# Patient Record
Sex: FEMALE | Race: BLACK OR AFRICAN AMERICAN | Employment: UNEMPLOYED | URBAN - METROPOLITAN AREA
[De-identification: names, ages, dates, MRNs, and addresses within clinical notes are randomized per-mention and may not be internally consistent; named-entity substitution may affect disease eponyms.]

---

## 2020-05-27 ENCOUNTER — HOSPITAL ENCOUNTER (EMERGENCY)
Age: 35
Discharge: HOME | End: 2020-05-28
Attending: EMERGENCY MEDICINE
Payer: COMMERCIAL

## 2020-05-27 PROCEDURE — 99283 EMERGENCY DEPT VISIT LOW MDM: CPT

## 2020-05-28 VITALS
OXYGEN SATURATION: 100 % | SYSTOLIC BLOOD PRESSURE: 136 MMHG | TEMPERATURE: 98.5 F | RESPIRATION RATE: 16 BRPM | HEART RATE: 67 BPM | DIASTOLIC BLOOD PRESSURE: 90 MMHG

## 2020-05-28 LAB
BACTERIA: ABNORMAL /HPF
BILIRUBIN URINE: NEGATIVE
BLOOD, URINE: NEGATIVE
CLARITY: ABNORMAL
COLOR: YELLOW
EPITHELIAL CELLS, UA: 7 /HPF (ref 0–5)
GLUCOSE URINE: NEGATIVE MG/DL
HCG(URINE) PREGNANCY TEST: NEGATIVE
HYALINE CASTS: 2 /LPF (ref 0–8)
KETONES, URINE: NEGATIVE MG/DL
LEUKOCYTE ESTERASE, URINE: NEGATIVE
MICROSCOPIC EXAMINATION: YES
NITRITE, URINE: NEGATIVE
PH UA: 6 (ref 5–8)
PROTEIN UA: NEGATIVE MG/DL
RBC UA: 2 /HPF (ref 0–4)
SPECIFIC GRAVITY UA: 1.01 (ref 1–1.03)
URINE REFLEX TO CULTURE: ABNORMAL
URINE TYPE: ABNORMAL
UROBILINOGEN, URINE: 0.2 E.U./DL
WBC UA: 6 /HPF (ref 0–5)

## 2020-05-28 PROCEDURE — 81001 URINALYSIS AUTO W/SCOPE: CPT

## 2020-05-28 PROCEDURE — 84703 CHORIONIC GONADOTROPIN ASSAY: CPT

## 2020-05-28 ASSESSMENT — ENCOUNTER SYMPTOMS
PHOTOPHOBIA: 0
TROUBLE SWALLOWING: 0
SORE THROAT: 0
ABDOMINAL PAIN: 0
SINUS PAIN: 0
VOMITING: 0
SHORTNESS OF BREATH: 0
NAUSEA: 0
COUGH: 0
SINUS PRESSURE: 0
COLOR CHANGE: 0

## 2020-05-28 NOTE — ED PROVIDER NOTES
[address] [facility]  EMERGENCY DEPARTMENTENCOUNTER      Pt Name: Effie Ware  MRN: [id number]  [place] 1985  Date ofevaluation: 5/27/2020  Provider: Emily Steward, [facility]       Chief Complaint   Patient presents with    Dizziness     patient reporting at [place] [address] and reports having one ernie and one shot was able to get to car, once in car her dizziness started. HISTORY OF PRESENT ILLNESS   (Location/Symptom, Timing/Onset,Context/Setting, Quality, Duration, Modifying Factors, Severity)  Note limiting factors. Effie Ware is a 38 y.o. female  who  has no past medical history on file. who presents to the emergency department for evaluation of dizziness. Patient states she has a history of intermittent episodes of vertigo and was seen by her PCP who was prescribed meclizine. She states that this evening she was at [employer], and when she tried to drive home she began feeling dizzy. She describes a sensation of the room spinning similar previous episodes. She states she did have multiple alcoholic beverages prior to the episode. She denies difficulty speaking facial droop paresthesias or weakness. She states her symptoms were improved when she closed her eyes. She states that are reproducible when she changes position or turns her head. Denies a history of congestion no recent illness ear fullness or changes in hearing. She states she has not seen a specialist about her symptoms. HPI    NursingNotes were reviewed. REVIEW OF SYSTEMS    (2-9 systems for level 4, 10 or more for level 5)     Review of Systems   Constitutional: Negative for activity change, fatigue and fever. HENT: Negative for congestion, ear discharge, ear pain, hearing loss, mouth sores, sinus pressure, sinus pain, sore throat and trouble swallowing. Eyes: Negative for photophobia and visual disturbance.    Respiratory: Negative for cough and shortness session: None    Stress: None   Relationships    Social connections     Talks on phone: None     Gets together: None     Attends Adventism service: None     Active member of club or organization: None     Attends meetings of clubs or organizations: None     Relationship status: None    Intimate partner violence     Fear of current or ex partner: None     Emotionally abused: None     Physically abused: None     Forced sexual activity: None   Other Topics Concern    None   Social History Narrative    None       SCREENINGS             PHYSICAL EXAM    (up to 7 for level 4, 8 or more for level 5)     ED Triage Vitals [05/27/20 2113]   BP Temp Temp src Pulse Resp SpO2 Height Weight   (!) 147/93 98.5 °F (36.9 °C) -- 73 16 98 % -- --       Physical Exam  Vitals signs and nursing note reviewed. Constitutional:       Appearance: She is well-developed. HENT:      Head: Normocephalic and atraumatic. Eyes:      Extraocular Movements: Extraocular movements intact. Right eye: No nystagmus. Left eye: No nystagmus. Conjunctiva/sclera: Conjunctivae normal.      Pupils: Pupils are equal, round, and reactive to light. Comments: Negative test of skew   Neck:      Musculoskeletal: Normal range of motion. Trachea: No tracheal deviation. Cardiovascular:      Rate and Rhythm: Normal rate and regular rhythm. Heart sounds: Normal heart sounds. Pulmonary:      Effort: Pulmonary effort is normal.      Breath sounds: Normal breath sounds. Abdominal:      General: There is no distension. Palpations: Abdomen is soft. Tenderness: There is no abdominal tenderness. Musculoskeletal: Normal range of motion. Skin:     General: Skin is warm and dry. Capillary Refill: Capillary refill takes less than 2 seconds. Neurological:      General: No focal deficit present. Mental Status: She is alert and oriented to person, place, and time. Mental status is at baseline.       Cranial Nerves:

## 2020-05-28 NOTE — ED NOTES
Pt Discharged in stable condition, VSS, no signs of distress, discharge instructions and meds reviewed. Pt verbalizes understanding and states no further questions or concerns unaddressed.        Jeff Augustin RN  05/28/20 4046

## 2020-06-09 ENCOUNTER — OFFICE VISIT (OUTPATIENT)
Dept: ENT CLINIC | Age: 35
End: 2020-06-09
Payer: COMMERCIAL

## 2020-06-09 VITALS — TEMPERATURE: 97.4 F | SYSTOLIC BLOOD PRESSURE: 113 MMHG | HEART RATE: 67 BPM | DIASTOLIC BLOOD PRESSURE: 80 MMHG

## 2020-06-09 PROCEDURE — 99204 OFFICE O/P NEW MOD 45 MIN: CPT | Performed by: OTOLARYNGOLOGY

## 2020-06-09 RX ORDER — BUSPIRONE HYDROCHLORIDE 5 MG/1
5 TABLET ORAL 3 TIMES DAILY PRN
COMMUNITY
Start: 2020-06-08 | End: 2020-09-06

## 2020-06-09 RX ORDER — MECLIZINE HCL 12.5 MG/1
TABLET ORAL
COMMUNITY
Start: 2020-03-31

## 2020-06-09 RX ORDER — METHYLPREDNISOLONE 4 MG/1
4 TABLET ORAL SEE ADMIN INSTRUCTIONS
Qty: 1 KIT | Refills: 0 | Status: SHIPPED | OUTPATIENT
Start: 2020-06-09

## 2020-06-09 ASSESSMENT — ENCOUNTER SYMPTOMS
RESPIRATORY NEGATIVE: 1
ALLERGIC/IMMUNOLOGIC NEGATIVE: 1
EYES NEGATIVE: 1

## 2022-10-26 ENCOUNTER — OFFICE VISIT (OUTPATIENT)
Dept: BARIATRICS/WEIGHT MGMT | Age: 37
End: 2022-10-26

## 2022-10-26 VITALS
SYSTOLIC BLOOD PRESSURE: 132 MMHG | WEIGHT: 289 LBS | BODY MASS INDEX: 54.56 KG/M2 | HEIGHT: 61 IN | DIASTOLIC BLOOD PRESSURE: 84 MMHG

## 2022-10-26 DIAGNOSIS — Z01.818 PREOPERATIVE CLEARANCE: ICD-10-CM

## 2022-10-26 DIAGNOSIS — F41.9 ANXIETY: ICD-10-CM

## 2022-10-26 PROBLEM — I10 HYPERTENSION: Status: ACTIVE | Noted: 2022-10-26

## 2022-10-26 PROCEDURE — 99999 PR OFFICE/OUTPT VISIT,PROCEDURE ONLY: CPT

## 2022-10-26 RX ORDER — HYDROXYZINE HYDROCHLORIDE 25 MG/1
25 TABLET, FILM COATED ORAL 3 TIMES DAILY PRN
COMMUNITY

## 2022-10-26 RX ORDER — BUSPIRONE HYDROCHLORIDE 15 MG/1
15 TABLET ORAL 3 TIMES DAILY
COMMUNITY

## 2022-10-26 RX ORDER — VENLAFAXINE 37.5 MG/1
37.5 TABLET ORAL 3 TIMES DAILY
COMMUNITY

## 2022-10-26 RX ORDER — METOPROLOL TARTRATE AND HYDROCHLOROTHIAZIDE 100; 50 MG/1; MG/1
1 TABLET ORAL DAILY
COMMUNITY

## 2022-10-26 NOTE — PROGRESS NOTES
Misa Adkins is a 42 y.o. female with a date of birth of 1985. Vitals:    10/26/22 1441   BP: 132/84   Site: Left Upper Arm   Weight: 289 lb (131.1 kg)   Height: 5' 1\" (1.549 m)    BMI: Body mass index is 54.61 kg/m². Obesity Classification: Class III    Weight History: Wt Readings from Last 3 Encounters:   10/26/22 289 lb (131.1 kg)   04/19/16 250 lb (113.4 kg)     Patient's lowest adult weight was 195 lb at age 17's. Patient's highest adult weight was 289 lb at age 42 current. Feels she gained significant weight when prescribed Zoloft. Patient has participated in the following weight loss programs: Weight Watcher Anonymous, Cabbage Soup Diet, , low fat, low carb. Patient has not participated in meal replacement/liquid diets. Patient has participated in weight loss medications. Contrave past year, had palpitations     Patient is not lactose intolerant. Patient does not have food allergies. Patient does not have Confucianist/cultural food preferences. 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 Breakfast: Sausage mc muffin OR 4 slices mary  Snack: None  Lunch: Chix salad on 1/2 croissant + crackers  Snack: None OR nuts OR orange   Dinner: 1/2 container chix salad  Snack: Oh snap pickles  Struggles to fall asleep, may stay up late  Drinks throughout the day: water, zero pop every other day, zero flavors   Do you drink alcohol? yes. How often/how much alcohol do you drink: 2-3 glasses of wine per month. Patient does not meet the criteria for binge eating disorder. Patient does not have grazing. Patient does have night eating. Patient does have a history of emotional eating or eating out of boredom.       Goals  Weight: 220 lbs  Health Improvement: better qol/ energy/ motivation, get off BP meds     Assessment  Nutritional Needs: RMR=(9.99 x 131.1) + (6.25 x 154.9) - (4.92 x 37 y.o.) -161= 1925 kcal x 1.3 (sedentary activity factor)= 2503 kcal - 1000 (for 2 lb weight loss/week)= 1503 kcal.    Plan  Plan/Recommendations: General weight loss/lifestyle modification strategies discussed (elicit support from others; identify saboteurs; non-food rewards, etc). Diet interventions: 1500 kcal LC. Regular aerobic exercise program discussed. Optifast:  Interested - sample provided   Diet Medications:  Interested   Surgery: Not Interested  Work with behaviorist     Handouts: LCFM, Protein drinks     PES Statement: Overweight/Obesity related to lack of exercise, sedentary lifestyle, unhealthy eating habits, and unsuccessful diet attempts as evidenced by BMI. Body mass index is 54.61 kg/m². Will follow up as necessary.     Ellen Beebe RD, LD

## 2022-10-27 ENCOUNTER — TELEMEDICINE (OUTPATIENT)
Dept: BARIATRICS/WEIGHT MGMT | Age: 37
End: 2022-10-27
Payer: COMMERCIAL

## 2022-10-27 DIAGNOSIS — E66.01 MORBID OBESITY WITH BMI OF 50.0-59.9, ADULT (HCC): Primary | ICD-10-CM

## 2022-10-27 DIAGNOSIS — Z71.3 DIETARY COUNSELING AND SURVEILLANCE: ICD-10-CM

## 2022-10-27 DIAGNOSIS — I10 HTN (HYPERTENSION), BENIGN: ICD-10-CM

## 2022-10-27 PROCEDURE — 99204 OFFICE O/P NEW MOD 45 MIN: CPT | Performed by: FAMILY MEDICINE

## 2022-10-27 ASSESSMENT — ENCOUNTER SYMPTOMS
CHOKING: 0
CHEST TIGHTNESS: 0
BLOOD IN STOOL: 0
NAUSEA: 0
ABDOMINAL PAIN: 0
COUGH: 0
VOMITING: 0
EYE PAIN: 0
APNEA: 0
DIARRHEA: 0
WHEEZING: 0
PHOTOPHOBIA: 0
CONSTIPATION: 0
SHORTNESS OF BREATH: 0
ABDOMINAL DISTENTION: 0

## 2022-10-27 NOTE — PROGRESS NOTES
Patient: Airam Vogt     Encounter Date: 10/27/2022    YOB: 1985               Age: 42 y.o. Patient identification was verified at the start of the visit. Patient-Reported Vitals 10/27/2022   Patient-Reported Weight 289   Patient-Reported Height 52   Patient-Reported Systolic 105   Patient-Reported Diastolic 84         BP Readings from Last 1 Encounters:   10/26/22 132/84       BMI Readings from Last 1 Encounters:   10/26/22 54.61 kg/m²       Pulse Readings from Last 1 Encounters:   06/09/20 67                                             Wt Readings from Last 3 Encounters:   10/26/22 289 lb (131.1 kg)   04/19/16 250 lb (113.4 kg)        Chief Complaint   Patient presents with    Bariatric, Initial Visit     MWMARBIN- NP         HPI:    42 y.o. female presents to establish care via video visit. The patient's medical history is significant for class III obesity. The patient has a long-standing history of obesity which started gradually. The problem is severe. The patient has been gaining weight. Risk factors include annual weight gain of >2 lbs (1 kg)/ year and sedentary lifestyle. Aggravating factors include poor diet and lack of physical activity. The patient has tried various diet/exercise plans which have been ineffective in the long-run. She is motivated to start losing weight to help improve her health. When did you become overweight? [] Childhood   [x] Teens   [] Adulthood   [] Pregnancy   [] Menopause    Highest adult weight: Current     Triggers for weight gain? [x] Stress   [] Illness   [x] Medications- Zoloft    [] Travel  []Injury     [] Nightshift work   [] Insomnia  [] No specific triggers   [] Other    Food triggers:   [x] Stress   [x] Boredom   [] Fast food   [] Eating out   [] Seeking reward   [] Social     Have you ever taken prescription medications to help you lose weight?    [x] Yes- Contrave (side effects- palpitations)  [] No    Have you ever been on a meal replacement program?  [] Yes  [x] No      The patient denies any significant cardiac or psychiatric disease. The patient denies a history thyroid disease. The patient denies a history of glaucoma. The patient denies a history of nephrolithiasis. The patient denies a history of seizure disorders/epiliepsy. Dietitian's assessment reviewed and addressed with patient. Reviewed:  [x] Nutrition and the importance of regular protein intake  [x] Hidden CHO/carbohydrate sources  [x] Alcohol use  [x] Tobacco use  [x] Drug use- Denies   [x] Importance of exercise and reducing sedentary time        Controlled Substance Monitoring:     No flowsheet data found. Allergies   Allergen Reactions    Amlodipine     Amoxicillin          Current Outpatient Medications:     hydrOXYzine HCl (ATARAX) 25 MG tablet, Take 25 mg by mouth 3 times daily as needed for Itching, Disp: , Rfl:     venlafaxine (EFFEXOR) 37.5 MG tablet, Take 37.5 mg by mouth 3 times daily, Disp: , Rfl:     metoprolol-hydroCHLOROthiazide (LOPRESSOR HCT) 100-50 MG per tablet, Take 1 tablet by mouth daily, Disp: , Rfl:     busPIRone (BUSPAR) 15 MG tablet, Take 15 mg by mouth 3 times daily, Disp: , Rfl:     meclizine (ANTIVERT) 12.5 MG tablet, TAKE 1 TABLET BY MOUTH THREE TIMES DAILY AS NEEDED FOR DIZZINESS (Patient not taking: Reported on 10/26/2022), Disp: , Rfl:     methylPREDNISolone (MEDROL, XIMENA,) 4 MG tablet, Take 1 tablet by mouth See Admin Instructions Take by mouth.  (Patient not taking: Reported on 10/26/2022), Disp: 1 kit, Rfl: 0    hydrochlorothiazide (HYDRODIURIL) 25 MG tablet, Take 25 mg by mouth daily (Patient not taking: Reported on 10/26/2022), Disp: , Rfl:     metoprolol (LOPRESSOR) 50 MG tablet, Take 50 mg by mouth 2 times daily (Patient not taking: Reported on 10/26/2022), Disp: , Rfl:     Patient Active Problem List   Diagnosis    Hypertension    Anxiety    Preoperative clearance       Past Medical History:   Diagnosis Date Anxiety        Past Surgical History:   Procedure Laterality Date    KNEE SURGERY         Family History   Problem Relation Age of Onset    Asthma Mother     Diabetes Mother     Hypertension Mother     Glaucoma Mother     Hypertension Father        Review of Systems   Constitutional:  Negative for fatigue. Eyes:  Negative for photophobia, pain and visual disturbance. Respiratory:  Negative for apnea, cough, choking, chest tightness, shortness of breath and wheezing. Cardiovascular:  Negative for chest pain, palpitations and leg swelling. Gastrointestinal:  Negative for abdominal distention, abdominal pain, blood in stool, constipation, diarrhea, nausea and vomiting. Endocrine: Negative for cold intolerance and heat intolerance. Musculoskeletal:  Negative for arthralgias and myalgias. Skin:  Negative for rash. Neurological:  Negative for dizziness, tremors, syncope, weakness, numbness and headaches. Psychiatric/Behavioral:  Negative for agitation, confusion, decreased concentration, dysphoric mood, hallucinations, sleep disturbance and suicidal ideas. The patient is not nervous/anxious and is not hyperactive. Physical Exam  Constitutional:       Appearance: She is well-developed. HENT:      Head: Normocephalic. Eyes:      Conjunctiva/sclera: Conjunctivae normal.   Abdominal:      General: Abdomen is protuberant. Musculoskeletal:         General: No swelling. Neurological:      Mental Status: She is alert and oriented to person, place, and time. Psychiatric:         Mood and Affect: Mood normal.         Behavior: Behavior normal.         Thought Content:  Thought content normal.         Judgment: Judgment normal.       Admission on 05/27/2020, Discharged on 05/28/2020   Component Date Value Ref Range Status    Color, UA 05/28/2020 YELLOW  Straw/Yellow Final    Clarity, UA 05/28/2020 CLOUDY (A)  Clear Final    Glucose, Ur 05/28/2020 Negative  Negative mg/dL Final    Bilirubin Urine 05/28/2020 Negative  Negative Final    Ketones, Urine 05/28/2020 Negative  Negative mg/dL Final    Specific Gravity, UA 05/28/2020 1.010  1.005 - 1.030 Final    Blood, Urine 05/28/2020 Negative  Negative Final    pH, UA 05/28/2020 6.0  5.0 - 8.0 Final    Protein, UA 05/28/2020 Negative  Negative mg/dL Final    Urobilinogen, Urine 05/28/2020 0.2  <2.0 E.U./dL Final    Nitrite, Urine 05/28/2020 Negative  Negative Final    Leukocyte Esterase, Urine 05/28/2020 Negative  Negative Final    Microscopic Examination 05/28/2020 YES   Final    Urine Type 05/28/2020 Voided   Final    Urine received in a container without preservatives. Urine Reflex to Culture 05/28/2020 Not Indicated   Final    HCG(Urine) Pregnancy Test 05/28/2020 Negative  Detects HCG level >20 MIU/mL Final    Comment: Note:  Always repeat results in question with a serum  quantitative pregnancy test. A serum hCG is positive  2-5 days before the urine hCG test.      Bacteria, UA 05/28/2020 1+ (A)  None Seen /HPF Final    Hyaline Casts, UA 05/28/2020 2  0 - 8 /LPF Final    WBC, UA 05/28/2020 6 (A)  0 - 5 /HPF Final    RBC, UA 05/28/2020 2  0 - 4 /HPF Final    Epithelial Cells, UA 05/28/2020 7 (A)  0 - 5 /HPF Final    Comment: Urinalysis microscopic performed using the  automated methodology (AUWI analyzer). Assessment and Plan:    1. Morbid obesity with BMI of 50.0-59.9, adult ([facility] [address]  Heavily counseled on the importance of therapeutic lifestyle changes through diet and exercise. The patient understands that the goal of treatment is to reach and stay at a healthy weight. The initial treatment goal is to lose at least 5-10% of her body weight in 12 weeks. This will require changes in eating habits, increased physical activity, and behavior changes. Counseled on low carb/dinorah diet. Patient handouts and education material provided and reviewed in detail with the patient. All questions answered.  Encouraged patient to keep a food journal and to bring it to her next visit. Discussed available treatment options in addition to lifestyle changes including medications or OPTIFAST. She is interested in anti-obesity medications (wants to avoid stimulant based tx). Saxenda or Oliver Dooleyon may be recommended at the next visit depending on her progress and lab results. Explained that medications/meal replacements are most effective as part of a comprehensive treatment plan that includes proper nutrition, physical activity, and behavior modification. The patient understands that she will need close follow-ups every 2-4 weeks if she starts treatment. Depending on the patient's success with these changes, she may also be a good candidate for bariatric surgery down the line. Follow-up in 2 weeks to discuss lab results and treatment plan. Patient advised that it is her responsibility to follow up on any ordered tests/lab results. Patient understands and agrees with the plan. - TSH with Reflex; Future  - Vitamin B12 & Folate; Future  - Vitamin D 25 Hydroxy; Future  - Comprehensive Metabolic Panel; Future  - Hemoglobin A1C; Future  - Lipid Panel; Future  - CBC; Future    2. Dietary counseling and surveillance  1500-Rafiq/low carb meal plan as prescribed. 3. HTN (hypertension), benign  Stable on tx.  5-10% weight loss can help improve this.            Nutrition:  [] LCHF/Ketogenic [x] Low carb/low-calorie diet [] Low-calorie diet     []Maintenance        FITTE:   [x] Cardio [] Resistance/stength exercises   [x] ACSM recommendations (150 minutes/week)           Behavior:   [x] Motivational interviewing performed    [] Referral for counseling  [x] Discussed strategies to overcome habits/challenges for focus      [x] Stress management   [x] Stimulus control  [x] Sleep hygiene      Orders Placed This Encounter   Procedures    TSH with Reflex     Standing Status:   Future     Standing Expiration Date:   10/27/2023    Vitamin B12 & Folate     Standing Status:   Future     Standing Expiration Date:   10/27/2023    Vitamin D 25 Hydroxy     Standing Status:   Future     Standing Expiration Date:   10/27/2023    Comprehensive Metabolic Panel     Standing Status:   Future     Standing Expiration Date:   10/27/2023    Hemoglobin A1C     Standing Status:   Future     Standing Expiration Date:   10/27/2023    Lipid Panel     Standing Status:   Future     Standing Expiration Date:   10/27/2023    CBC     Standing Status:   Future     Standing Expiration Date:   10/27/2023         No follow-ups on file. Saige Marc is a 42 y.o. female being evaluated by a Virtual Visit (video visit) encounter to address concerns as mentioned above. A caregiver was present when appropriate. Due to this being a TeleHealth encounter (During [facility]- public health emergency), evaluation of the following organ systems was limited: Vitals/Constitutional/EENT/Resp/CV/GI//MS/Neuro/Skin/Heme-Lymph-Imm. Pursuant to the emergency declaration under the [address] [place] [address] and the [employer] and [employer] Act, this Virtual Visit was conducted with patient's (and/or legal guardian's) consent, to reduce the patient's risk of exposure to COVID-19 and provide necessary medical care. The patient (and/or legal guardian) has also been advised to contact this office for worsening conditions or problems, and seek emergency medical treatment and/or call 911 if deemed necessary. Services were provided through a video synchronous discussion virtually to substitute for in-person clinic visit. Patient and provider were located at their individual homes. --Cesar Baires MD on 10/27/2022 at 2:30 PM    An electronic signature was used to authenticate this note.      Greater than 50% of this 45 minute visit was used for  -Preparing to see the patient such as reviewing the pt records   Obtaining and/or reviewing separately obtained history Performing a medically appropriate history    Counseling and educating the patient, family, and/or caregiver   Ordering prescirption medications, tests, or procedures   Documenting clinical information in the electronic or other health record

## 2022-11-11 DIAGNOSIS — E66.01 MORBID OBESITY WITH BMI OF 50.0-59.9, ADULT (HCC): ICD-10-CM

## 2022-11-11 LAB
HCT VFR BLD CALC: 30 % (ref 36–48)
HEMOGLOBIN: 9.2 G/DL (ref 12–16)
MCH RBC QN AUTO: 22 PG (ref 26–34)
MCHC RBC AUTO-ENTMCNC: 30.8 G/DL (ref 31–36)
MCV RBC AUTO: 71.5 FL (ref 80–100)
PDW BLD-RTO: 18.4 % (ref 12.4–15.4)
PLATELET # BLD: 343 K/UL (ref 135–450)
PMV BLD AUTO: 8 FL (ref 5–10.5)
RBC # BLD: 4.19 M/UL (ref 4–5.2)
WBC # BLD: 5 K/UL (ref 4–11)

## 2022-11-12 LAB
A/G RATIO: 1.7 (ref 1.1–2.2)
ALBUMIN SERPL-MCNC: 4.5 G/DL (ref 3.4–5)
ALP BLD-CCNC: 76 U/L (ref 40–129)
ALT SERPL-CCNC: 15 U/L (ref 10–40)
ANION GAP SERPL CALCULATED.3IONS-SCNC: 16 MMOL/L (ref 3–16)
AST SERPL-CCNC: 17 U/L (ref 15–37)
BILIRUB SERPL-MCNC: 0.3 MG/DL (ref 0–1)
BUN BLDV-MCNC: 13 MG/DL (ref 7–20)
CALCIUM SERPL-MCNC: 9.6 MG/DL (ref 8.3–10.6)
CHLORIDE BLD-SCNC: 103 MMOL/L (ref 99–110)
CHOLESTEROL, TOTAL: 173 MG/DL (ref 0–199)
CO2: 23 MMOL/L (ref 21–32)
CREAT SERPL-MCNC: 0.8 MG/DL (ref 0.6–1.1)
ESTIMATED AVERAGE GLUCOSE: 108.3 MG/DL
FOLATE: 7.84 NG/ML (ref 4.78–24.2)
GFR SERPL CREATININE-BSD FRML MDRD: >60 ML/MIN/{1.73_M2}
GLUCOSE BLD-MCNC: 93 MG/DL (ref 70–99)
HBA1C MFR BLD: 5.4 %
HDLC SERPL-MCNC: 43 MG/DL (ref 40–60)
LDL CHOLESTEROL CALCULATED: 108 MG/DL
POTASSIUM SERPL-SCNC: 3.8 MMOL/L (ref 3.5–5.1)
SODIUM BLD-SCNC: 142 MMOL/L (ref 136–145)
TOTAL PROTEIN: 7.1 G/DL (ref 6.4–8.2)
TRIGL SERPL-MCNC: 112 MG/DL (ref 0–150)
TSH REFLEX: 1.44 UIU/ML (ref 0.27–4.2)
VITAMIN B-12: 606 PG/ML (ref 211–911)
VITAMIN D 25-HYDROXY: 19.9 NG/ML
VLDLC SERPL CALC-MCNC: 22 MG/DL

## 2022-11-16 ENCOUNTER — TELEPHONE (OUTPATIENT)
Dept: BARIATRICS/WEIGHT MGMT | Age: 37
End: 2022-11-16

## 2022-11-16 ENCOUNTER — TELEMEDICINE (OUTPATIENT)
Dept: BARIATRICS/WEIGHT MGMT | Age: 37
End: 2022-11-16
Payer: COMMERCIAL

## 2022-11-16 DIAGNOSIS — Z71.3 DIETARY COUNSELING AND SURVEILLANCE: ICD-10-CM

## 2022-11-16 DIAGNOSIS — D50.9 IRON DEFICIENCY ANEMIA, UNSPECIFIED IRON DEFICIENCY ANEMIA TYPE: ICD-10-CM

## 2022-11-16 DIAGNOSIS — E55.9 VITAMIN D DEFICIENCY: ICD-10-CM

## 2022-11-16 DIAGNOSIS — E66.01 MORBID OBESITY WITH BMI OF 50.0-59.9, ADULT (HCC): Primary | ICD-10-CM

## 2022-11-16 PROCEDURE — 99214 OFFICE O/P EST MOD 30 MIN: CPT | Performed by: FAMILY MEDICINE

## 2022-11-16 RX ORDER — ERGOCALCIFEROL 1.25 MG/1
50000 CAPSULE ORAL WEEKLY
Qty: 8 CAPSULE | Refills: 0 | Status: SHIPPED | OUTPATIENT
Start: 2022-11-16

## 2022-11-16 RX ORDER — LIRAGLUTIDE 6 MG/ML
INJECTION, SOLUTION SUBCUTANEOUS
Qty: 5 ADJUSTABLE DOSE PRE-FILLED PEN SYRINGE | Refills: 0 | Status: SHIPPED | OUTPATIENT
Start: 2022-11-16

## 2022-11-16 ASSESSMENT — ENCOUNTER SYMPTOMS
VOMITING: 0
DIARRHEA: 0
COUGH: 0
NAUSEA: 0
WHEEZING: 0
CHOKING: 0
CONSTIPATION: 0
CHEST TIGHTNESS: 0
BLOOD IN STOOL: 0
PHOTOPHOBIA: 0
ABDOMINAL PAIN: 0
ABDOMINAL DISTENTION: 0
EYE PAIN: 0
APNEA: 0
SHORTNESS OF BREATH: 0

## 2022-11-16 NOTE — PROGRESS NOTES
Patient: Soniya Corporal                      Encounter Date: 11/16/2022    YOB: 1985                Age: 42 y.o. Chief Complaint   Patient presents with    Weight Management     F/u MWM           Patient identification was verified at the start of the visit. Patient-Reported Vitals 10/27/2022   Patient-Reported Weight 289   Patient-Reported Height 52   Patient-Reported Systolic 040   Patient-Reported Diastolic 84         BP Readings from Last 1 Encounters:   10/26/22 132/84       BMI Readings from Last 1 Encounters:   10/26/22 54.61 kg/m²       Pulse Readings from Last 1 Encounters:   06/09/20 67          Wt Readings from Last 3 Encounters:   10/26/22 289 lb (131.1 kg)   04/19/16 250 lb (113.4 kg)        HPI: 42 y.o. female with a long-standing history of obesity presents today for virtual video follow-up. Her weight is stable from her last visit. Current treatment includes low carb/dinorah diet. Tolerating it well. Food recall reviewed with the patient. Making better dietary choices. Motivated to continue losing weight. Interested in aom to help with appetite regulation.      Labs completed and reviewed     Vit D 19.9      H/H 9.2/30.0  MCV 71.5    H/o menorrhagia  Just restarted iron supplements a few days ago  Sees gynecologist/PCP       Allergies   Allergen Reactions    Amlodipine     Amoxicillin          Current Outpatient Medications:     liraglutide-weight management (SAXENDA) 18 MG/3ML SOPN, Week 1       0.6 mg sc once daily  Week 2      1.2 mg sc once daily  Weeks 3    1.8 mg sc once daily  Week 4      2.4 mg sc once daily  Week 5       3 mg   sc once daily, Disp: 5 Adjustable Dose Pre-filled Pen Syringe, Rfl: 0    vitamin D (ERGOCALCIFEROL) 1.25 MG (85077 UT) CAPS capsule, Take 1 capsule by mouth once a week, Disp: 8 capsule, Rfl: 0    hydrOXYzine HCl (ATARAX) 25 MG tablet, Take 25 mg by mouth 3 times daily as needed for Itching, Disp: , Rfl:     venlafaxine (EFFEXOR) 37.5 MG tablet, Take 37.5 mg by mouth 3 times daily, Disp: , Rfl:     metoprolol-hydroCHLOROthiazide (LOPRESSOR HCT) 100-50 MG per tablet, Take 1 tablet by mouth daily, Disp: , Rfl:     busPIRone (BUSPAR) 15 MG tablet, Take 15 mg by mouth 3 times daily, Disp: , Rfl:     meclizine (ANTIVERT) 12.5 MG tablet, TAKE 1 TABLET BY MOUTH THREE TIMES DAILY AS NEEDED FOR DIZZINESS (Patient not taking: Reported on 10/26/2022), Disp: , Rfl:     methylPREDNISolone (MEDROL, XIMENA,) 4 MG tablet, Take 1 tablet by mouth See Admin Instructions Take by mouth. (Patient not taking: Reported on 10/26/2022), Disp: 1 kit, Rfl: 0    hydrochlorothiazide (HYDRODIURIL) 25 MG tablet, Take 25 mg by mouth daily (Patient not taking: Reported on 10/26/2022), Disp: , Rfl:     metoprolol (LOPRESSOR) 50 MG tablet, Take 50 mg by mouth 2 times daily (Patient not taking: Reported on 10/26/2022), Disp: , Rfl:     Patient Active Problem List   Diagnosis    Hypertension    Anxiety    Preoperative clearance       Review of Systems   Constitutional:  Negative for fatigue. Eyes:  Negative for photophobia, pain and visual disturbance. Respiratory:  Negative for apnea, cough, choking, chest tightness, shortness of breath and wheezing. Cardiovascular:  Negative for chest pain, palpitations and leg swelling. Gastrointestinal:  Negative for abdominal distention, abdominal pain, blood in stool, constipation, diarrhea, nausea and vomiting. Endocrine: Negative for cold intolerance and heat intolerance. Musculoskeletal:  Negative for arthralgias and myalgias. Skin:  Negative for rash. Neurological:  Negative for dizziness, tremors, syncope, weakness, numbness and headaches. Psychiatric/Behavioral:  Negative for agitation, confusion, decreased concentration, dysphoric mood, hallucinations, sleep disturbance and suicidal ideas. The patient is not nervous/anxious and is not hyperactive. Physical Exam  Constitutional:       Appearance: She is well-developed. HENT:      Head: Normocephalic. Eyes:      Conjunctiva/sclera: Conjunctivae normal.   Abdominal:      General: Abdomen is protuberant. Musculoskeletal:         General: No swelling. Neurological:      Mental Status: She is alert and oriented to person, place, and time. Psychiatric:         Mood and Affect: Mood normal.         Behavior: Behavior normal.         Thought Content: Thought content normal.         Judgment: Judgment normal.       Orders Only on 11/11/2022   Component Date Value Ref Range Status    WBC 11/11/2022 5.0  4.0 - 11.0 K/uL Final    RBC 11/11/2022 4.19  4.00 - 5.20 M/uL Final    Hemoglobin 11/11/2022 9.2 (A)  12.0 - 16.0 g/dL Final    Hematocrit 11/11/2022 30.0 (A)  36.0 - 48.0 % Final    MCV 11/11/2022 71.5 (A)  80.0 - 100.0 fL Final    MCH 11/11/2022 22.0 (A)  26.0 - 34.0 pg Final    MCHC 11/11/2022 30.8 (A)  31.0 - 36.0 g/dL Final    RDW 11/11/2022 18.4 (A)  12.4 - 15.4 % Final    Platelets 02/69/6945 343  135 - 450 K/uL Final    MPV 11/11/2022 8.0  5.0 - 10.5 fL Final    Cholesterol, Total 11/11/2022 173  0 - 199 mg/dL Final    Triglycerides 11/11/2022 112  0 - 150 mg/dL Final    HDL 11/11/2022 43  40 - 60 mg/dL Final    Comment: An HDL cholesterol less than 40 mg/dL is low and  constitutes a coronary heart disease risk factor. An HDL cholesterol greater than 60 mg/dL is a  negative risk factor for coronary heart disease.       LDL Calculated 11/11/2022 108 (A)  <100 mg/dL Final    VLDL Cholesterol Calculated 11/11/2022 22  Not Established mg/dL Final    Hemoglobin A1C 11/11/2022 5.4  See comment % Final    Comment: Comment:  Diagnosis of Diabetes: > or = 6.5%  Increased risk of diabetes (Prediabetes): 5.7-6.4%  Glycemic Control: Nonpregnant Adults: <7.0%                    Pregnant: <6.0%        eAG 11/11/2022 108.3  mg/dL Final    Sodium 11/11/2022 142  136 - 145 mmol/L Final    Potassium 11/11/2022 3.8  3.5 - 5.1 mmol/L Final    Chloride 11/11/2022 103  99 - 110 mmol/L Final CO2 11/11/2022 23  21 - 32 mmol/L Final    Anion Gap 11/11/2022 16  3 - 16 Final    Glucose 11/11/2022 93  70 - 99 mg/dL Final    BUN 11/11/2022 13  7 - 20 mg/dL Final    Creatinine 11/11/2022 0.8  0.6 - 1.1 mg/dL Final    Est, Glom Filt Rate 11/11/2022 >60  >60 Final    Comment: Pediatric calculator link  [email or web address] org/professionals/kdoqi/gfr_calculatorped  Effective Oct 3, 2022  These results are not intended for use in patients  <25years of age. eGFR results are calculated without  a race factor using the 2021 CKD-EPI equation. Careful  clinical correlation is recommended, particularly when  comparing to results calculated using previous equations. The CKD-EPI equation is less accurate in patients with  extremes of muscle mass, extra-renal metabolism of  creatinine, excessive creatinine ingestion, or following  therapy that affects renal tubular secretion. Calcium 11/11/2022 9.6  8.3 - 10.6 mg/dL Final    Total Protein 11/11/2022 7.1  6.4 - 8.2 g/dL Final    Albumin 11/11/2022 4.5  3.4 - 5.0 g/dL Final    Albumin/Globulin Ratio 11/11/2022 1.7  1.1 - 2.2 Final    Total Bilirubin 11/11/2022 0.3  0.0 - 1.0 mg/dL Final    Alkaline Phosphatase 11/11/2022 76  40 - 129 U/L Final    ALT 11/11/2022 15  10 - 40 U/L Final    AST 11/11/2022 17  15 - 37 U/L Final    Vit D, 25-Hydroxy 11/11/2022 19.9 (A)  >=30 ng/mL Final    Comment: <=20 ng/mL. ........... Tanda Bun Deficient  21-29 ng/mL. ......... Tanda Bun Insufficient  >=30 ng/mL. ........ Tanda Bun Sufficient      Vitamin B-12 11/11/2022 606  211 - 911 pg/mL Final    Folate 11/11/2022 7.84  4.78 - 24.20 ng/mL Final    Comment: Effective 11-15-16 10:00am EST  Please note reference ranges have  changed for Folate. TSH 11/11/2022 1.44  0.27 - 4.20 uIU/mL Final         Assessment and Plan:  1. Morbid obesity with BMI of 50.0-59.9, adult ([facility] [address]  Discussed risks, benefits and alternatives of Saxenda.   Patient meets BMI criteria, denies any personal or family history of medullary thyroid cancer, denies a history of multiple endocrine neoplasia syndrome type II, pancreatitis, alcoholism, high blood triglyceride levels, known allergy to liraglutide, and is not pregnant or planning to become pregnant. Counseled patient on proper use and potential side effects including nausea, vomiting, diarrhea, constipation, hypoglycemia, headache, decreased appetite, dyspepsia, fatigue, dizziness, abdominal pain, and increased lipase levels. Patient advised to report any side effects or if she develops a mass in the neck, dysphagia, dyspnea, or persistent hoarseness. Heavily counseled on the importance of therapeutic lifestyle changes through diet and exercise. 2. Dietary counseling and surveillance  1500-Rafiq/low carb meal plan. 3. Iron deficiency anemia, unspecified iron deficiency anemia type  Continue ferrous sulfate 325 mg BID. Check labs as ordered. F/u with PCP. - Iron and TIBC; Future  - Ferritin; Future    4. Vitamin D deficiency  Start Vit D 50,000 IU weekly x 8 weeks. Repeat labs in 3 months.         Nutrition Plan: [] LCHF/Ketogenic   [x] Modified low-calorie diet (low carb/low-rafiq)               [] Low-calorie diet    [] Maintenance       []Other        Exercise: [x] Cardio     [] Resistance/strength training                       [x] ACSM recommendations (150 minutes/week in active weight loss)               Behavior: [x] Motivational interviewing performed    [] Referral for counseling                         [x] Discussed strategies to overcome habits/challenges for focus         [] Stress management   [x] Stimulus control         [] Sleep hygiene      Reviewed:  [x] Nutrition and the importance of regular protein intake  [x] Hidden carbohydrate sources  [x] Alcohol use  [x] Tobacco use   [x] Drug use- Denies  [x] Importance of exercise and reducing sedentary time  [x] Treatment consent- Patient understands and agrees with the treatment plan   [x] Proper use of medication and side effects  [x] Labs      Controlled Substance Monitoring:    No flowsheet data found. Patient denies any history known hypersensitivity to the prescribing meds, history of pancreatitis, or personal or family history of thyroid medullary cancer. Treatment start date: 11/17/22  12 weeks: 2/17/22  Starting weight: 289 pounds    Goal: At least 5% (14 pounds)         Key dietary points:    - Meats (preferably organic or grass fed) are great sources of protein and have no carbohydrates. - Recommend coconut, olive, avocado, or almond oils. - When buying dairy, choose regular or full fat options. - Choose vegetables that grow above ground as they are generally lower in carbohydrates and higher in fiber.  - Avoid starches such as bread, rice, potatoes, pasta and all sources of simple sugars (desserts, soda, breakfast cereals). - Choose beverages that are calorie and sugar free. Reminder regarding weight loss medications: You must be seen in office every 2-4 weeks to haveyour prescriptions refilled. If you are off of your medication for longer than 7 days, you will not be able to restart the medication for at least 6 months. Always call our office to report any side effects. Females, it is your responsibility to obtain negative pregnancy tests each month. Orders Placed This Encounter   Procedures    Iron and TIBC     Standing Status:   Future     Standing Expiration Date:   11/16/2023    Ferritin     Standing Status:   Future     Standing Expiration Date:   11/16/2023       No follow-ups on file. Jose Haney is a 42 y.o. female being evaluated by a Virtual Visit (video visit) encounter to address concerns as mentioned above. A caregiver was present when appropriate. Due to this being a TeleHealth encounter (During [facility]SJCK-17 public health emergency), evaluation of the following organ systems was limited: Vitals/Constitutional/EENT/Resp/CV/GI//MS/Neuro/Skin/Heme-Lymph-Imm. Pursuant to the emergency declaration under the [facility], [address] [place] [address] authority and the [employer] Act, this Virtual Visit was conducted with patient's (and/or legal guardian's) consent, to reduce the patient's risk of exposure to COVID-19 and provide necessary medical care. The patient (and/or legal guardian) has also been advised to contact this office for worsening conditions or problems, and seek emergency medical treatment and/or call 911 if deemed necessary.

## 2022-11-22 NOTE — TELEPHONE ENCOUNTER
Prior auth denied by insurance. Benefit excludes coverage for medications related to obesity/weight loss. Patient notified. Scheduled appt with Dr. Niels Gallo to discuss further options.

## 2022-11-25 PROBLEM — Z01.818 PREOPERATIVE CLEARANCE: Status: RESOLVED | Noted: 2022-10-26 | Resolved: 2022-11-25

## 2022-12-01 ENCOUNTER — TELEMEDICINE (OUTPATIENT)
Dept: BARIATRICS/WEIGHT MGMT | Age: 37
End: 2022-12-01
Payer: COMMERCIAL

## 2022-12-01 DIAGNOSIS — Z71.3 DIETARY COUNSELING AND SURVEILLANCE: ICD-10-CM

## 2022-12-01 DIAGNOSIS — E66.01 MORBID OBESITY WITH BMI OF 50.0-59.9, ADULT (HCC): Primary | ICD-10-CM

## 2022-12-01 PROCEDURE — 99213 OFFICE O/P EST LOW 20 MIN: CPT | Performed by: FAMILY MEDICINE

## 2022-12-01 NOTE — PROGRESS NOTES
Patient: Jacklyn Kilgore                      Encounter Date: 12/1/2022    YOB: 1985                Age: 42 y.o. Chief Complaint   Patient presents with    Weight Management     F/u MWM         Patient identification was verified at the start of the visit. Patient-Reported Vitals 10/27/2022   Patient-Reported Weight 289   Patient-Reported Height 52   Patient-Reported Systolic 561   Patient-Reported Diastolic 84         BP Readings from Last 1 Encounters:   10/26/22 132/84       BMI Readings from Last 1 Encounters:   10/26/22 54.61 kg/m²       Pulse Readings from Last 1 Encounters:   06/09/20 67          Wt Readings from Last 3 Encounters:   10/26/22 289 lb (131.1 kg)   04/19/16 250 lb (113.4 kg)        HPI: 42 y.o. female with a long-standing history of obesity presents today for virtual video follow-up. Her weight is stable from her last visit. She did not stat Saxenda due to lack of coverage/cost of tx.          Allergies   Allergen Reactions    Amlodipine     Amoxicillin          Current Outpatient Medications:     liraglutide-weight management (SAXENDA) 18 MG/3ML SOPN, Week 1       0.6 mg sc once daily  Week 2      1.2 mg sc once daily  Weeks 3    1.8 mg sc once daily  Week 4      2.4 mg sc once daily  Week 5       3 mg   sc once daily, Disp: 5 Adjustable Dose Pre-filled Pen Syringe, Rfl: 0    vitamin D (ERGOCALCIFEROL) 1.25 MG (19350 UT) CAPS capsule, Take 1 capsule by mouth once a week, Disp: 8 capsule, Rfl: 0    hydrOXYzine HCl (ATARAX) 25 MG tablet, Take 25 mg by mouth 3 times daily as needed for Itching, Disp: , Rfl:     venlafaxine (EFFEXOR) 37.5 MG tablet, Take 37.5 mg by mouth 3 times daily, Disp: , Rfl:     metoprolol-hydroCHLOROthiazide (LOPRESSOR HCT) 100-50 MG per tablet, Take 1 tablet by mouth daily, Disp: , Rfl:     busPIRone (BUSPAR) 15 MG tablet, Take 15 mg by mouth 3 times daily, Disp: , Rfl:     meclizine (ANTIVERT) 12.5 MG tablet, TAKE 1 TABLET BY MOUTH THREE TIMES DAILY AS NEEDED FOR DIZZINESS (Patient not taking: Reported on 10/26/2022), Disp: , Rfl:     methylPREDNISolone (MEDROL, XIMENA,) 4 MG tablet, Take 1 tablet by mouth See Admin Instructions Take by mouth. (Patient not taking: Reported on 10/26/2022), Disp: 1 kit, Rfl: 0    hydrochlorothiazide (HYDRODIURIL) 25 MG tablet, Take 25 mg by mouth daily (Patient not taking: Reported on 10/26/2022), Disp: , Rfl:     metoprolol (LOPRESSOR) 50 MG tablet, Take 50 mg by mouth 2 times daily (Patient not taking: Reported on 10/26/2022), Disp: , Rfl:     Patient Active Problem List   Diagnosis    Hypertension    Anxiety       Review of Systems   Constitutional:  Negative for fatigue. Eyes:  Negative for photophobia, pain and visual disturbance. Respiratory:  Negative for apnea, cough, choking, chest tightness, shortness of breath and wheezing. Cardiovascular:  Negative for chest pain, palpitations and leg swelling. Gastrointestinal:  Negative for abdominal distention, abdominal pain, blood in stool, constipation, diarrhea, nausea and vomiting. Endocrine: Negative for cold intolerance and heat intolerance. Musculoskeletal:  Negative for arthralgias and myalgias. Skin:  Negative for rash. Neurological:  Negative for dizziness, tremors, syncope, weakness, numbness and headaches. Psychiatric/Behavioral:  Negative for agitation, confusion, decreased concentration, dysphoric mood, hallucinations, sleep disturbance and suicidal ideas. The patient is not nervous/anxious and is not hyperactive. Physical Exam  Constitutional:       Appearance: She is well-developed. HENT:      Head: Normocephalic. Eyes:      Conjunctiva/sclera: Conjunctivae normal.   Abdominal:      General: Abdomen is protuberant. Musculoskeletal:         General: No swelling. Neurological:      Mental Status: She is alert and oriented to person, place, and time.    Psychiatric:         Mood and Affect: Mood normal.         Behavior: Behavior normal.         Thought Content: Thought content normal.         Judgment: Judgment normal.       Orders Only on 11/11/2022   Component Date Value Ref Range Status    WBC 11/11/2022 5.0  4.0 - 11.0 K/uL Final    RBC 11/11/2022 4.19  4.00 - 5.20 M/uL Final    Hemoglobin 11/11/2022 9.2 (A)  12.0 - 16.0 g/dL Final    Hematocrit 11/11/2022 30.0 (A)  36.0 - 48.0 % Final    MCV 11/11/2022 71.5 (A)  80.0 - 100.0 fL Final    MCH 11/11/2022 22.0 (A)  26.0 - 34.0 pg Final    MCHC 11/11/2022 30.8 (A)  31.0 - 36.0 g/dL Final    RDW 11/11/2022 18.4 (A)  12.4 - 15.4 % Final    Platelets 17/76/5469 343  135 - 450 K/uL Final    MPV 11/11/2022 8.0  5.0 - 10.5 fL Final    Cholesterol, Total 11/11/2022 173  0 - 199 mg/dL Final    Triglycerides 11/11/2022 112  0 - 150 mg/dL Final    HDL 11/11/2022 43  40 - 60 mg/dL Final    Comment: An HDL cholesterol less than 40 mg/dL is low and  constitutes a coronary heart disease risk factor. An HDL cholesterol greater than 60 mg/dL is a  negative risk factor for coronary heart disease.       LDL Calculated 11/11/2022 108 (A)  <100 mg/dL Final    VLDL Cholesterol Calculated 11/11/2022 22  Not Established mg/dL Final    Hemoglobin A1C 11/11/2022 5.4  See comment % Final    Comment: Comment:  Diagnosis of Diabetes: > or = 6.5%  Increased risk of diabetes (Prediabetes): 5.7-6.4%  Glycemic Control: Nonpregnant Adults: <7.0%                    Pregnant: <6.0%        eAG 11/11/2022 108.3  mg/dL Final    Sodium 11/11/2022 142  136 - 145 mmol/L Final    Potassium 11/11/2022 3.8  3.5 - 5.1 mmol/L Final    Chloride 11/11/2022 103  99 - 110 mmol/L Final    CO2 11/11/2022 23  21 - 32 mmol/L Final    Anion Gap 11/11/2022 16  3 - 16 Final    Glucose 11/11/2022 93  70 - 99 mg/dL Final    BUN 11/11/2022 13  7 - 20 mg/dL Final    Creatinine 11/11/2022 0.8  0.6 - 1.1 mg/dL Final    Est, Glom Filt Rate 11/11/2022 >60  >60 Final    Comment: Pediatric calculator link  [email or web address] org/professionals/kdoqi/gfr_calculatorped  Effective Oct 3, 2022  These results are not intended for use in patients  <25years of age. eGFR results are calculated without  a race factor using the 2021 CKD-EPI equation. Careful  clinical correlation is recommended, particularly when  comparing to results calculated using previous equations. The CKD-EPI equation is less accurate in patients with  extremes of muscle mass, extra-renal metabolism of  creatinine, excessive creatinine ingestion, or following  therapy that affects renal tubular secretion. Calcium 11/11/2022 9.6  8.3 - 10.6 mg/dL Final    Total Protein 11/11/2022 7.1  6.4 - 8.2 g/dL Final    Albumin 11/11/2022 4.5  3.4 - 5.0 g/dL Final    Albumin/Globulin Ratio 11/11/2022 1.7  1.1 - 2.2 Final    Total Bilirubin 11/11/2022 0.3  0.0 - 1.0 mg/dL Final    Alkaline Phosphatase 11/11/2022 76  40 - 129 U/L Final    ALT 11/11/2022 15  10 - 40 U/L Final    AST 11/11/2022 17  15 - 37 U/L Final    Vit D, 25-Hydroxy 11/11/2022 19.9 (A)  >=30 ng/mL Final    Comment: <=20 ng/mL. ........... Joseline Skyler Deficient  21-29 ng/mL. ......... Joseline Holland Insufficient  >=30 ng/mL. ........ Joseline Holland Sufficient      Vitamin B-12 11/11/2022 606  211 - 911 pg/mL Final    Folate 11/11/2022 7.84  4.78 - 24.20 ng/mL Final    Comment: Effective 11-15-16 10:00am EST  Please note reference ranges have  changed for Folate. TSH 11/11/2022 1.44  0.27 - 4.20 uIU/mL Final         Assessment and Plan:  1. Morbid obesity with BMI of 50.0-59.9, adult (HCC)  Stable, not at goal.  Reviewed alternative tx options but due to lack of coverage/cost of tx- unable to start tx. Will continue to work on lifestyle management. F/u prn dietary counseling. 2. Dietary counseling and surveillance  Low carb/dinorah meal plan as recommended. Avoid skipping meals. Plan/prep meals ahead of time. Avoid soda/juice/sugary drinks. Be mindful of portion sizes.        Nutrition Plan: [] LCHF/Ketogenic   [x] Modified low-calorie diet (low carb/low-dinorah)               [] Low-calorie diet    [] Maintenance       []Other        Exercise: [x] Cardio     [x] Resistance/strength training                       [x] ACSM recommendations (150 minutes/week in active weight loss)               Behavior: [x] Motivational interviewing performed    [] Referral for counseling                         [x] Discussed strategies to overcome habits/challenges for focus         [] Stress management   [x] Stimulus control         [] Sleep hygiene      Reviewed:  [x] Nutrition and the importance of regular protein intake  [x] Hidden carbohydrate sources  [x] Alcohol use  [x] Tobacco use   [x] Drug use- Denies  [x] Importance of exercise and reducingsedentary time  [x] Treatment consent- Patient understands and agrees with the treatment plan   [x] Proper use of medication and side effects          Key dietary points:    - Meats (preferably organic or grass fed) are great sources of protein and have no carbohydrates. - Recommend coconut, olive, avocado, or almond oils. - When buying dairy, choose regular or full fat options. - Choose vegetables that grow above ground as they are generally lower in carbohydrates and higher in fiber.  - Avoid starches such as bread, rice, potatoes, pasta and all sources of simple sugars (desserts, soda, breakfast cereals). - Choose beverages that are calorie and sugar free. No orders of the defined types were placed in this encounter. No follow-ups on file. Luul Woodson is a 42 y.o. female being evaluated by a Virtual Visit (video visit) encounter to address concerns as mentioned above. A caregiver was present when appropriate. Due to this being a TeleHealth encounter (During [facility]- public health emergency), evaluation of the following organ systems was limited: Vitals/Constitutional/EENT/Resp/CV/GI//MS/Neuro/Skin/Heme-Lymph-Imm.   Pursuant to the emergency declaration under the [employer] Act and the [address] waiver authority and the [employer] and [employer] Act, this Virtual Visit was conducted with patient's (and/or legal guardian's) consent, to reduce the patient's risk of exposure to COVID-19 and provide necessary medical care. The patient (and/or legal guardian) has also been advised to contact this office for worsening conditions or problems, and seek emergency medical treatment and/or call 911 if deemed necessary.

## 2022-12-09 ASSESSMENT — ENCOUNTER SYMPTOMS
APNEA: 0
NAUSEA: 0
BLOOD IN STOOL: 0
ABDOMINAL PAIN: 0
WHEEZING: 0
CHOKING: 0
COUGH: 0
CONSTIPATION: 0
DIARRHEA: 0
PHOTOPHOBIA: 0
SHORTNESS OF BREATH: 0
CHEST TIGHTNESS: 0
EYE PAIN: 0
ABDOMINAL DISTENTION: 0
VOMITING: 0